# Patient Record
Sex: MALE | Race: WHITE | URBAN - METROPOLITAN AREA
[De-identification: names, ages, dates, MRNs, and addresses within clinical notes are randomized per-mention and may not be internally consistent; named-entity substitution may affect disease eponyms.]

---

## 2022-05-28 ENCOUNTER — OFFICE VISIT (OUTPATIENT)
Dept: URBAN - METROPOLITAN AREA CLINIC 43 | Facility: CLINIC | Age: 27
End: 2022-05-28
Payer: MEDICAID

## 2022-05-28 DIAGNOSIS — H53.149 PHOTOPHOBIA: ICD-10-CM

## 2022-05-28 DIAGNOSIS — H52.13 MYOPIA, BILATERAL: ICD-10-CM

## 2022-05-28 DIAGNOSIS — H16.223 KERATOCONJUNCTIVITIS SICCA, BILATERAL: Primary | ICD-10-CM

## 2022-05-28 PROCEDURE — 92004 COMPRE OPH EXAM NEW PT 1/>: CPT | Performed by: OPTOMETRIST

## 2022-05-28 ASSESSMENT — KERATOMETRY
OD: 44.50
OS: 44.38

## 2022-05-28 ASSESSMENT — VISUAL ACUITY
OD: 20/25
OS: 20/25

## 2022-05-28 ASSESSMENT — INTRAOCULAR PRESSURE
OS: 16
OD: 16

## 2022-05-28 NOTE — IMPRESSION/PLAN
Impression: Photophobia: H53.149. 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   Recommend Rx sunglasses that wrap around, cont care with neurology

## 2022-05-28 NOTE — IMPRESSION/PLAN
Impression: Keratoconjunctivitis sicca, bilateral: T67.283. Plan: Recommend artificial tears at least 4 times a day and gel drop or tear ointment at bedtime.